# Patient Record
Sex: FEMALE | Race: WHITE | NOT HISPANIC OR LATINO | Employment: OTHER | URBAN - METROPOLITAN AREA
[De-identification: names, ages, dates, MRNs, and addresses within clinical notes are randomized per-mention and may not be internally consistent; named-entity substitution may affect disease eponyms.]

---

## 2017-09-27 ENCOUNTER — OFFICE VISIT (OUTPATIENT)
Dept: NEUROLOGY | Facility: CLINIC | Age: 62
End: 2017-09-27

## 2017-09-27 VITALS
BODY MASS INDEX: 35.87 KG/M2 | DIASTOLIC BLOOD PRESSURE: 82 MMHG | WEIGHT: 190 LBS | HEIGHT: 61 IN | SYSTOLIC BLOOD PRESSURE: 120 MMHG

## 2017-09-27 DIAGNOSIS — G25.0 ESSENTIAL TREMOR: Primary | ICD-10-CM

## 2017-09-27 PROCEDURE — 99204 OFFICE O/P NEW MOD 45 MIN: CPT | Performed by: PHYSICIAN ASSISTANT

## 2017-09-27 RX ORDER — MELATONIN
1000 DAILY
COMMUNITY

## 2017-09-27 RX ORDER — ESTRADIOL 1 MG/1
TABLET ORAL
COMMUNITY
Start: 2017-07-13

## 2017-09-27 RX ORDER — MIRABEGRON 25 MG/1
TABLET, FILM COATED, EXTENDED RELEASE ORAL
COMMUNITY
Start: 2017-08-07

## 2017-09-27 RX ORDER — FLUOXETINE 10 MG/1
30 TABLET, FILM COATED ORAL
COMMUNITY
Start: 2017-09-14

## 2017-09-27 RX ORDER — LANOLIN ALCOHOL/MO/W.PET/CERES
100 CREAM (GRAM) TOPICAL DAILY
COMMUNITY

## 2017-09-27 RX ORDER — PRIMIDONE 250 MG/1
TABLET ORAL
COMMUNITY
Start: 2017-08-08

## 2017-09-27 RX ORDER — PRAVASTATIN SODIUM 10 MG
TABLET ORAL
COMMUNITY
Start: 2017-09-07

## 2017-09-27 RX ORDER — LOSARTAN POTASSIUM AND HYDROCHLOROTHIAZIDE 25; 100 MG/1; MG/1
TABLET ORAL
COMMUNITY
Start: 2017-07-13

## 2017-09-27 RX ORDER — NAPROXEN 500 MG/1
TABLET ORAL
COMMUNITY
Start: 2017-08-23

## 2017-09-27 RX ORDER — ASPIRIN 81 MG/1
81 TABLET ORAL DAILY
COMMUNITY

## 2017-09-27 RX ORDER — TOPIRAMATE 100 MG/1
TABLET, FILM COATED ORAL
COMMUNITY
Start: 2017-09-20

## 2017-09-27 RX ORDER — PROPRANOLOL HYDROCHLORIDE 80 MG/1
TABLET ORAL
COMMUNITY
Start: 2017-08-23

## 2017-09-27 NOTE — PROGRESS NOTES
"Subjective     History of Present Illness   Eleanor Pollock is a 62 y.o. female who comes to clinic today for evaluation of tremor. She has noted a primarily intentional tremor in her hands bilaterally since at least 2011. She has also noted a tremor in her head and trunk. She notes that the tremor is worse while writing and eating as well as with increased stress. She denies any associated resting tremor, shuffling gait, bradykinesia, or rigidity.     Prior evaluation has included an MRI, which was reportedly notable for white matter changes, but otherwise unremarkable. She is currently taking TPM 100mg BID, propranolol 80mg BID, and primidone 250mg BID. These medications have been somewhat beneficial. She also notes that hemp oil is somewhat beneficial.       I have reviewed and confirmed the past family, social and medical history as accurate on 9/17/17.      Review of Systems   Constitutional: Negative.    HENT: Negative.    Eyes: Negative.    Respiratory: Negative.    Cardiovascular: Negative.    Gastrointestinal: Negative.    Endocrine: Negative.    Genitourinary: Negative.    Musculoskeletal: Negative.    Skin: Negative.    Allergic/Immunologic: Negative.    Neurological:        As noted in HPI   Hematological: Negative.    Psychiatric/Behavioral: Negative.        Objective     /82  Ht 61\" (154.9 cm)  Wt 190 lb (86.2 kg)  BMI 35.9 kg/m2    General appearance today is normal.   Peripheral pulses were present and symmetric.  The ophthalmoscopic exam today is unremarkable. The discs and posterior elements are unremarkable.      Physical Exam   Neurological: She has normal strength. She has a normal Finger-Nose-Finger Test and a normal Romberg Test. Gait normal.   Reflex Scores:       Tricep reflexes are 1+ on the right side and 1+ on the left side.       Bicep reflexes are 1+ on the right side and 1+ on the left side.       Brachioradialis reflexes are 1+ on the right side and 1+ on the left side.       " Patellar reflexes are 1+ on the right side and 1+ on the left side.  Psychiatric: Her speech is normal.        Neurologic Exam     Mental Status   Speech: speech is normal   Level of consciousness: alert  Normal comprehension.     Cranial Nerves   Cranial nerves II through XII intact.     Motor Exam   Muscle bulk: normal  Overall muscle tone: normal    Strength   Strength 5/5 throughout.     Sensory Exam   Light touch normal.     Gait, Coordination, and Reflexes     Gait  Gait: normal    Coordination   Romberg: negative  Finger to nose coordination: normal    Tremor   Intention tremor: present (present in hands bilaterally)    Reflexes   Right brachioradialis: 1+  Left brachioradialis: 1+  Right biceps: 1+  Left biceps: 1+  Right triceps: 1+  Left triceps: 1+  Right patellar: 1+  Left patellar: 1+       Tremor noted in head.            Assessment/Plan   Eleanor was seen today for tremors.    Diagnoses and all orders for this visit:    Essential tremor  -     Ambulatory Referral to Neurosurgery          Discussion/Summary   Eleanor Pollock comes to clinic today for evaluation of tremor. Her history and neurological examination today is consistent with an essential tremor. This was discussed with the patient. We discussed potential treatment options, including increasing her TPM and Primidone, which was quite reasonably declined. I also discussed a potentially referring her to  for DBS, which she would like to proceed with. She will then follow up in our clinic on an as needed basis.        I spent 30 minutes out of 45 minutes face to face with the patient and discussing diagnosis, prognosis, diagnostic testing, evaluation, current status, treatment options and management.    As part of this visit I reviewed radiology results and reviewed outside records.    Chelsea Fish PA-C